# Patient Record
Sex: FEMALE | Race: WHITE | Employment: PART TIME | URBAN - METROPOLITAN AREA
[De-identification: names, ages, dates, MRNs, and addresses within clinical notes are randomized per-mention and may not be internally consistent; named-entity substitution may affect disease eponyms.]

---

## 2020-12-11 ENCOUNTER — OFFICE VISIT (OUTPATIENT)
Dept: ORTHOPEDIC SURGERY | Age: 65
End: 2020-12-11
Payer: COMMERCIAL

## 2020-12-11 VITALS — BODY MASS INDEX: 25.83 KG/M2 | TEMPERATURE: 96.6 F | RESPIRATION RATE: 12 BRPM | WEIGHT: 155 LBS | HEIGHT: 65 IN

## 2020-12-11 PROCEDURE — 1036F TOBACCO NON-USER: CPT | Performed by: ORTHOPAEDIC SURGERY

## 2020-12-11 PROCEDURE — G8484 FLU IMMUNIZE NO ADMIN: HCPCS | Performed by: ORTHOPAEDIC SURGERY

## 2020-12-11 PROCEDURE — G8427 DOCREV CUR MEDS BY ELIG CLIN: HCPCS | Performed by: ORTHOPAEDIC SURGERY

## 2020-12-11 PROCEDURE — 99203 OFFICE O/P NEW LOW 30 MIN: CPT | Performed by: ORTHOPAEDIC SURGERY

## 2020-12-11 PROCEDURE — G8419 CALC BMI OUT NRM PARAM NOF/U: HCPCS | Performed by: ORTHOPAEDIC SURGERY

## 2020-12-11 PROCEDURE — 3017F COLORECTAL CA SCREEN DOC REV: CPT | Performed by: ORTHOPAEDIC SURGERY

## 2020-12-11 RX ORDER — LATANOPROST 50 UG/ML
SOLUTION/ DROPS OPHTHALMIC
COMMUNITY
Start: 2020-11-11

## 2020-12-11 RX ORDER — MELOXICAM 15 MG/1
15 TABLET ORAL DAILY
Qty: 30 TABLET | Refills: 2 | Status: SHIPPED | OUTPATIENT
Start: 2020-12-11 | End: 2021-03-11

## 2020-12-11 ASSESSMENT — ENCOUNTER SYMPTOMS
GASTROINTESTINAL NEGATIVE: 1
EYES NEGATIVE: 1
ALLERGIC/IMMUNOLOGIC NEGATIVE: 1
RESPIRATORY NEGATIVE: 1
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
## 2020-12-14 ENCOUNTER — HOSPITAL ENCOUNTER (OUTPATIENT)
Dept: PHYSICAL THERAPY | Age: 65
Setting detail: THERAPIES SERIES
Discharge: HOME | End: 2020-12-14
Payer: COMMERCIAL

## 2020-12-14 PROCEDURE — 97161 PT EVAL LOW COMPLEX 20 MIN: CPT | Performed by: PHYSICAL THERAPIST

## 2020-12-14 PROCEDURE — 97110 THERAPEUTIC EXERCISES: CPT | Performed by: PHYSICAL THERAPIST

## 2020-12-14 PROCEDURE — 97530 THERAPEUTIC ACTIVITIES: CPT | Performed by: PHYSICAL THERAPIST

## 2020-12-14 NOTE — PLAN OF CARE
[facility], [address] [place], [address]  Phone: [phone]   Fax: [phone]          Physical Therapy Certification    Dear Referring Practitioner: Mohini Zhu,    We had the pleasure of evaluating the following patient for physical therapy services at [address] [place] [address]. A summary of our findings can be found in the initial assessment below. This includes our plan of care. If you have any questions or concerns regarding these findings, please do not hesitate to contact me at the office phone number checked above. Thank you for the referral.       Physician Signature:_______________________________Date:__________________  By signing above (or electronic signature), therapists plan is approved by physician      Patient: Maria Elena Bellamy   : 1955   MRN: [id number]  Referring Physician: Referring Practitioner: Mohini Zhu      Evaluation Date: 2020      Medical Diagnosis Information:  Diagnosis: M75.41 (ICD-10-CM) - Impingement syndrome of right shoulder; M25.511 (ICD-10-CM) - Acute pain of right shoulder   Treatment Diagnosis: M25.511 (ICD-10-CM) - Acute pain of right shoulder                                           Precautions/ Contra-indications: h/o frozen shoulder on the left. Hysterectomy in .   C-SSRS Triggered by Intake questionnaire (Past 2 wk assessment):   [x] No, Questionnaire did not trigger screening.   [] Yes, Patient intake triggered further evaluation      [] C-SSRS Screening completed  [] PCP notified via Plan of Care  [] Emergency services notified   Latex Allergy:  [x]NO      []YES  Preferred Language for Healthcare:   [x]English       []other: Supraspinatus      Upper Trap      Lower Trap      Mid Trap      Rhomboids      Biceps      Triceps      Horizontal Abduction      Horizontal Adduction      Lats        Special Tests Left Right   Apley Scratch IR:  ER:   Cross body: IR: to T2  ER:  Cross Body:   Neer's     Full Can     Empty Can     Rey Platts Negative, but CBA +    Nerve Tension Testing     Speed's     Kirby's      Spurling's     Repeated Scaption                Reflexes/Sensation (myotomes/dermatomes): pt denies sensation changes    Joint mobility: tightness noted in IR   [x]Normal    []Hypo   []Hyper    Palpation: -TTP, but palpable knot noticed on mid trap    Functional Mobility/Transfers: see above    Posture: good overall    Bandages/Dressings/Incisions: NA    Gait:  WFL    Orthopedic Special Tests: see above                       [x] Patient history, allergies, meds reviewed. Medical chart reviewed. See intake form. Review Of Systems (ROS):  [x]Performed Review of systems (Integumentary, CardioPulmonary, Neurological) by intake and observation. Intake form has been scanned into medical record. Patient has been instructed to contact their primary care physician regarding ROS issues if not already being addressed at this time.       Co-morbidities/Complexities (which will affect course of rehabilitation):   []None           Arthritic conditions   []Rheumatoid arthritis (M05.9)  []Osteoarthritis (M19.91)   Cardiovascular conditions   []Hypertension (I10)  []Hyperlipidemia (E78.5)  []Angina pectoris (I20)  []Atherosclerosis (I70)   Musculoskeletal conditions   []Disc pathology   []Congenital spine pathologies   []Prior surgical intervention  []Osteoporosis (M81.8)  []Osteopenia (M85.8)   Endocrine conditions   []Hypothyroid (E03.9)  []Hyperthyroid Gastrointestinal conditions   []Constipation (U87.74)   Metabolic conditions   []Morbid obesity (E66.01)  []Diabetes type 1(E10.65) or 2 (E11.65)   []Neuropathy (G60.9) Pulmonary conditions   []Asthma (J45)  []Coughing   []COPD (J44.9)   Psychological Disorders  []Anxiety (F41.9)  []Depression (F32.9)   []Other:   [x]Other: h/o left adhesive capsulitis         Barriers to/and or personal factors that will affect rehab potential:              []Age  []Sex              []Motivation/Lack of Motivation                        []Co-Morbidities              []Cognitive Function, education/learning barriers              []Environmental, home barriers              [x]profession/work barriers  [x]past PT/medical experience  []other:  Justification: Pt has been seen several years ago for her contralateral shoulder. She is very busy at work as a  where she uses her arms for prolonged periods of time. She is motivated to improve. Her strength seems to be her main limitation with some tightness in her middle trap and in IR, which she reports has improved significantly since starting the medication. Falls Risk Assessment (30 days):   [x] Falls Risk assessed and no intervention required.   [] Falls Risk assessed and Patient requires intervention due to being higher risk   TUG score (>12s at risk):     [] Falls education provided, including           ASSESSMENT:   Functional Impairments   []Noted spinal or UE joint hypomobility   []Noted spinal or UE joint hypermobility   [x]Decreased UE functional ROM   [x]Decreased UE functional strength   []Abnormal reflexes/sensation/myotomal/dermatomal deficits   [x]Decreased RC/scapular/core strength and neuromuscular control   []other:      Functional Activity Limitations (from functional questionnaire and intake)   [x]Reduced ability to tolerate prolonged functional positions   []Reduced ability or difficulty with changes of positions or transfers between positions   []Reduced ability to maintain good posture and demonstrate good body mechanics with sitting, bending, and lifting [] Reduced ability or tolerance with driving and/or computer work   []Reduced ability to sleep   [x]Reduced ability to perform lifting, reaching, carrying tasks   []Reduced ability to tolerate impact through UE   []Reduced ability to reach behind back   []Reduced ability to  or hold objects   []Reduced ability to throw or toss an object   []other:    Participation Restrictions   [x]Reduced participation in self care activities   [x]Reduced participation in home management activities   [x]Reduced participation in work activities   []Reduced participation in social activities. []Reduced participation in sport/recreation activities. Classification:   []Signs/symptoms consistent with post-surgical status including decreased ROM, strength and function.   []Signs/symptoms consistent with joint sprain/strain   []Signs/symptoms consistent with shoulder impingement   []Signs/symptoms consistent with shoulder/elbow/wrist tendinopathy   []Signs/symptoms consistent with Rotator cuff tear   []Signs/symptoms consistent with labral tear   []Signs/symptoms consistent with postural dysfunction    []Signs/symptoms consistent with Glenohumeral IR Deficit - <45 degrees   []Signs/symptoms consistent with facet dysfunction of cervical/thoracic spine    []Signs/symptoms consistent with pathology which may benefit from Dry needling []other: Pt is a 62 y/o female presenting with diagnosis of right shoulder impingement and pain from the MD.  Clinically, the pt presents with decreased ROM, decreased strength, decreased function, and increased pain consistent with the MD diagnosis. The pt would benefit from skilled PT to return to PLOF. Pt has been seen several years ago for her contralateral shoulder. She is very busy at work as a  where she uses her arms for prolonged periods of time. She is motivated to improve. Her strength seems to be her main limitation with some tightness in her middle trap and in IR, which she reports has improved significantly since starting the medication. We did review insurance benefits. All of pt's questions were answered. Pt was agreeable. Prognosis/Rehab Potential:      []Excellent   [x]Good    []Fair   []Poor    Tolerance of evaluation/treatment:    []Excellent   [x]Good    []Fair   []Poor    PLAN  Frequency/Duration:  1-2 days per week for 4-6 Weeks:  Interventions:  [x]  Therapeutic exercise including: strength training, ROM, for Lower extremity and core   [x]  NMR activation and proprioception for LE, Glutes and Core   [x]  Manual therapy as indicated for LE, Hip and spine to include: Dry Needling/IASTM, STM, PROM, Gr I-IV mobilizations, manipulation. [x] Modalities as needed that may include: thermal agents, E-stim, Biofeedback, US, iontophoresis as indicated  [x] Patient education on joint protection, postural re-education, activity modification, progression of HEP. HEP instruction: (see scanned forms)      GOALS:   Patient stated goal: be free of pain    [] Progressing: [] Met: [] Not Met: [] Adjusted    Therapist goals for Patient:   Short Term Goals: To be achieved in: 2 weeks  1. Independent in HEP and progression per patient tolerance, in order to prevent re-injury.      [] Progressing: [] Met: [] Not Met: [] Adjusted 2. Patient will report pain at worst less than or equal to 3/10. [] Progressing: [] Met: [] Not Met: [] Adjusted    Long Term Goals: To be achieved in: 6 weeks  1. Pt will demo UEFI of 90% or better to assist with reaching prior level of function. [] Progressing: [] Met: [] Not Met: [] Adjusted  2. Patient will demonstrate increased AROM to shoulder flex greater than or equal to 175, ABD greater than or equal to 180, ER greater than or equal to 110, IR greater than or equal to 60 to allow for proper joint functioning as indicated by patients Functional Deficits. [] Progressing: [] Met: [] Not Met: [] Adjusted  3. Patient will demonstrate an increase in strength to shoulder flex greater than or equal to 4+, ABD greater than or equal to 4+, ER greater than or equal to 4, IR greater than or equal to 4+ to allow for proper functional mobility as indicated by patients functional deficits. [] Progressing: [] Met: [] Not Met: [] Adjusted  4. Patient will return to work activities without increased symptoms or restriction. [] Progressing: [] Met: [] Not Met: [] Adjusted  5. Pt will report pain at worst less than or equal to 2/10.   [] Progressing: [] Met: [] Not Met: [] Adjusted          Physical Therapy Evaluation Complexity Justification  [x] A history of present problem with:  [] no personal factors and/or comorbidities that impact the plan of care;  [x]1-2 personal factors and/or comorbidities that impact the plan of care  []3 personal factors and/or comorbidities that impact the plan of care  [x] An examination of body systems using standardized tests and measures addressing any of the following: body structures and functions (impairments), activity limitations, and/or participation restrictions;:  [] a total of 1-2 or more elements   [x] a total of 3 or more elements   [] a total of 4 or more elements   [x] A clinical presentation with:  [x] stable and/or uncomplicated characteristics [] evolving clinical presentation with changing characteristics  [] unstable and unpredictable characteristics;   [x] Clinical decision making of [x] low, [] moderate, [] high complexity using standardized patient assessment instrument and/or measurable assessment of functional outcome.     [x] EVAL (LOW) 94708 (typically 20 minutes face-to-face)  [] EVAL (MOD) 64071 (typically 30 minutes face-to-face)  [] EVAL (HIGH) 80137 (typically 45 minutes face-to-face)  [] Rox Nieves    Electronically signed by:  Ivon Chatterjee, PT, DPT [id number]

## 2020-12-14 NOTE — FLOWSHEET NOTE
Orthopaedics and [address] [place] [address] DR CHRISTI RIDER                   Physical Therapy Treatment Note/ Progress Report:           Date:  2020    Patient Name:  Conley Schwab    :  1955  MRN: [id number]  Restrictions/Precautions:    Medical/Treatment Diagnosis Information:  · Diagnosis: M75.41 (ICD-10-CM) - Impingement syndrome of right shoulder; M25.511 (ICD-10-CM) - Acute pain of right shoulder. Onset- Oct-2020  · Treatment Diagnosis: M25.511 (ICD-10-CM) - Acute pain of right shoulder  Insurance/Certification information:  PT Insurance Information: [facility]  Physician Information:  Referring Practitioner: Criselda Gunter  Has the plan of care been signed (Y/N):        []  Yes  [x]  No     Date of Patient follow up with Physician: prn after 2021      Is this a Progress Report:     []  Yes  [x]  No        If Yes:  Date Range for reporting period:  Beginning 14 Dec 2020  Ending    Progress report will be due (10 Rx or 30 days whichever is less): visit 10 or 8336       Recertification will be due (POC Duration  / 90 days whichever is less): see above        Visit # Insurance Allowable Auth Required   1 BMN []  Yes [x]  No  Massage and ESU not covered        Functional Scale: UEFI-Incomplete   Date assessed: 2020    Latex Allergy:  [x]NO      []YES  Preferred Language for Healthcare:   [x]English       []other:    Pain level:  See eval     SUBJECTIVE:  See eval    OBJECTIVE: See eval  ? Observation:   ? Test measurements:      ROM PROM AROM  Comment    L R L R    Flexion        Abduction        ER        IR        Other        Other             Strength L R Comment   Flexion      Abduction      ER      IR      Supraspinatus      Upper Trap      Lower Trap      Mid Trap      Rhomboids      Biceps      Triceps      Horizontal Abduction      Horizontal Adduction      Lats          RESTRICTIONS/PRECAUTIONS: h/o frozen shoulder on the left. Hysterectomy in .       Exercises/Interventions: Exercise/Equipment Resistance/Repetitions Other comments   Aerobic Conditioning     Aerodyne          Stretching/PROM     Wand     Table Slides     Wall slides      UE [place]     Pulleys     Pendulum     BB IR     SL IR 10x:10    Pec doorway stretch     CBA stretch     UT stretch     Barrel hug 10x:10         LS stretch     Isometrics     Retraction          Weight shift     Flexion 10x:10    Abduction 10x:10    External Rotation 10x:10    Internal Rotation 10x:10    Biceps     Triceps          PRE's     Flexion     Abduction     External Rotation     Internal Rotation     Shrugs     EXT 3x10 red    Reverse Flys     Serratus 3x10    Horizontal Abd with ER     Biceps     Triceps     Retraction 3x10 blue         Cable Column/Theraband     External Rotation     Internal Rotation     Shrugs     Lats     Ext     Flex     Scapular Retraction     BIC     TRIC     PNF          Dynamic Stability          Plyoback            Access Code: [email or web address]   URL: [email or web address]   Date: 12/14/2020   Prepared by: Rose Marie Archibald     Exercises  Seated 55 Gallon Barrel Hug Stretch - 10 reps - 10 hold - 2x daily - 7x weekly  Sleeper Stretch - 10 reps - 10 hold - 2x daily - 7x weekly  Supine Scapular Protraction in Flexion with Dumbbells - 10 reps - 3 sets - 2x daily - 7x weekly  Isometric Shoulder Flexion at Wall - 10 reps - 10 hold - 1x daily - 7x weekly  Standing Isometric Shoulder Internal Rotation at Doorway - 10 reps - 10 hold - 1x daily - 7x weekly  Standing Isometric Shoulder External Rotation with Doorway - 10 reps - 10 hold - 1x daily - 7x weekly  Isometric Shoulder Abduction at Wall - 10 reps - 10 hold - 1x daily                            - 7x weekly  Scapular Retraction with Resistance - 10 reps - 3 sets - 1-2x daily - 7x weekly  Shoulder extension with resistance - Neutral - 10 reps - 3 sets - 1-2x daily - 7x weekly      Therapeutic Exercise and NMR EXR [x] (65262) Provided verbal/tactile cueing for activities related to strengthening, flexibility, endurance, ROM for improvements in LE, proximal hip, and core control with self care, mobility, lifting, ambulation. [x] (13922) Provided verbal/tactile cueing for activities related to improving balance, coordination, kinesthetic sense, posture, motor skill, proprioception  to assist with LE, proximal hip, and core control in self care, mobility, lifting, ambulation and eccentric single leg control.      NMR and Therapeutic Activities:    [x] (45348 or 20377) Provided verbal/tactile cueing for activities related to improving balance, coordination, kinesthetic sense, posture, motor skill, proprioception and motor activation to allow for proper function of core, proximal hip and LE with self care and ADLs  [x] (14470) Gait Re-education- Provided training and instruction to the patient for proper LE, core and proximal hip recruitment and positioning and eccentric body weight control with ambulation re-education including up and down stairs     Home Exercise Program:    [x] (23795) Reviewed/Progressed HEP activities related to strengthening, flexibility, endurance, ROM of core, proximal hip and LE for functional self-care, mobility, lifting and ambulation/stair navigation   [x] (59998)Reviewed/Progressed HEP activities related to improving balance, coordination, kinesthetic sense, posture, motor skill, proprioception of core, proximal hip and LE for self care, mobility, lifting, and ambulation/stair navigation      Manual Treatments:  PROM / STM / Oscillations-Mobs:  G-I, II, III, IV (PA's, Inf., Post.) [x] (28922) Provided manual therapy to mobilize LE, proximal hip and/or LS spine soft tissue/joints for the purpose of modulating pain, promoting relaxation,  increasing ROM, reducing/eliminating soft tissue swelling/inflammation/restriction, improving soft tissue extensibility and allowing for proper ROM for normal function with self care, mobility, lifting and ambulation. Modalities:      Charges:  Timed Code Treatment Minutes: 30'   Total Treatment Minutes: 61'     [x] EVAL (LOW) 29137   [] EVAL (MOD) 45168   [] EVAL (HIGH) 97864   [] RE-EVAL   [x] MM(63360) x  1   [] IONTO  [] NMR (59314) x     [] VASO  [] Manual (01517) x      [] Other:  [x] TA x   1   [] Mech Traction (64320)  [] ES(attended) (65468)      [] ES (un) (15864): NOT COVERED AS WELL AS MASSAGE       GOALS:   Patient stated goal: be free of pain    []? Progressing: []? Met: []? Not Met: []? Adjusted     Therapist goals for Patient:   Short Term Goals: To be achieved in: 2 weeks  1. Independent in HEP and progression per patient tolerance, in order to prevent re-injury. []? Progressing: []? Met: []? Not Met: []? Adjusted   2. Patient will report pain at worst less than or equal to 3/10. []? Progressing: []? Met: []? Not Met: []? Adjusted     Long Term Goals: To be achieved in: 6 weeks  1. Pt will demo UEFI of 90% or better to assist with reaching prior level of function. []? Progressing: []? Met: []? Not Met: []? Adjusted  2. Patient will demonstrate increased AROM to shoulder flex greater than or equal to 175, ABD greater than or equal to 180, ER greater than or equal to 110, IR greater than or equal to 60 to allow for proper joint functioning as indicated by patients Functional Deficits. []? Progressing: []? Met: []? Not Met: []?  Adjusted

## 2020-12-21 ENCOUNTER — HOSPITAL ENCOUNTER (OUTPATIENT)
Dept: PHYSICAL THERAPY | Age: 65
Setting detail: THERAPIES SERIES
Discharge: HOME | End: 2020-12-21
Payer: COMMERCIAL

## 2020-12-21 PROCEDURE — 97110 THERAPEUTIC EXERCISES: CPT | Performed by: PHYSICAL THERAPIST

## 2020-12-21 PROCEDURE — 97530 THERAPEUTIC ACTIVITIES: CPT | Performed by: PHYSICAL THERAPIST

## 2020-12-21 NOTE — FLOWSHEET NOTE
Supine Shoulder Flexion AAROM with Hands Clasped - 1-2x daily - 7x weekly  Standing Shoulder Scaption - 10 reps - 3 sets - 1x daily - 3x weekly  Standing Wall Ball Circles with [address] - 10 reps - 3 sets - 1x daily - 3x weekly  Standing Shoulder Shrugs - 10 reps - 3 sets - 1x daily - 3x weekly  Standing Bicep Curls Supinated with Dumbbells - 10 reps - 3 sets                            - 1x daily - 3x weekly      Therapeutic Exercise and NMR EXR  [x] (01048) Provided verbal/tactile cueing for activities related to strengthening, flexibility, endurance, ROM for improvements in LE, proximal hip, and core control with self care, mobility, lifting, ambulation. [x] (38418) Provided verbal/tactile cueing for activities related to improving balance, coordination, kinesthetic sense, posture, motor skill, proprioception  to assist with LE, proximal hip, and core control in self care, mobility, lifting, ambulation and eccentric single leg control.      NMR and Therapeutic Activities:    [x] (22506 or 55063) Provided verbal/tactile cueing for activities related to improving balance, coordination, kinesthetic sense, posture, motor skill, proprioception and motor activation to allow for proper function of core, proximal hip and LE with self care and ADLs  [x] (63115) Gait Re-education- Provided training and instruction to the patient for proper LE, core and proximal hip recruitment and positioning and eccentric body weight control with ambulation re-education including up and down stairs     Home Exercise Program:    [x] (33773) Reviewed/Progressed HEP activities related to strengthening, flexibility, endurance, ROM of core, proximal hip and LE for functional self-care, mobility, lifting and ambulation/stair navigation 2. Patient will demonstrate increased AROM to shoulder flex greater than or equal to 175, ABD greater than or equal to 180, ER greater than or equal to 110, IR greater than or equal to 60 to allow for proper joint functioning as indicated by patients Functional Deficits. []? Progressing: []? Met: []? Not Met: []? Adjusted  3. Patient will demonstrate an increase in strength to shoulder flex greater than or equal to 4+, ABD greater than or equal to 4+, ER greater than or equal to 4, IR greater than or equal to 4+ to allow for proper functional mobility as indicated by patients functional deficits. []? Progressing: []? Met: []? Not Met: []? Adjusted  4. Patient will return to work activities without increased symptoms or restriction. []? Progressing: []? Met: []? Not Met: []? Adjusted  5. Pt will report pain at worst less than or equal to 2/10. []? Progressing: []? Met: []? Not Met: []? Adjusted         Overall Progression Towards Functional goals/ Treatment Progress Update:  [] Patient is progressing as expected towards functional goals listed. [] Progression is slowed due to complexities/Impairments listed. [] Progression has been slowed due to co-morbidities.   [x] Plan just implemented, too soon to assess goals progression <30days   [] Goals require adjustment due to lack of progress  [] Patient is not progressing as expected and requires additional follow up with physician  [] Other    Prognosis for POC: [x] Good [] Fair  [] Poor      Patient requires continued skilled intervention: [x] Yes  [] No    Treatment/Activity Tolerance:  [x] Patient able to complete treatment  [] Patient limited by fatigue  [] Patient limited by pain     [] Patient limited by other medical complications [] Other: Pt hermila tx well. She hermila several progressions without c/o. I did progress her HEP as well. She demo good comprehension of her HEP with minor cuing needed. The newer exercises required mod cuing. Pt is making good progress overall. Pt would benefit from continued skilled PT to improve strength, ROM, and function. PLAN: If pt doesn't return, this note can be considered a D/C note. Pt to return after the New Year due to the holiday.   [x] Continue per plan of care [] Alter current plan (see comments above)  [] Plan of care initiated [] Hold pending MD visit [] Discharge  Electronically signed by: Tameka Perdue, PRINCESST [id number]

## 2021-01-04 ENCOUNTER — HOSPITAL ENCOUNTER (OUTPATIENT)
Dept: PHYSICAL THERAPY | Age: 66
Setting detail: THERAPIES SERIES
Discharge: HOME | End: 2021-01-04
Payer: COMMERCIAL

## 2021-01-04 PROCEDURE — 97110 THERAPEUTIC EXERCISES: CPT | Performed by: PHYSICAL THERAPIST

## 2021-01-04 PROCEDURE — 97530 THERAPEUTIC ACTIVITIES: CPT | Performed by: PHYSICAL THERAPIST

## 2021-01-04 NOTE — FLOWSHEET NOTE
Orthopaedics and [address] [place] [address] DR CHRISTI RIDER                   Physical Therapy Treatment Note/ Progress Report:           Date:  2021    Patient Name:  Lizzy Rojo    :  1955  MRN: [id number]  Restrictions/Precautions:    Medical/Treatment Diagnosis Information:  · Diagnosis: M75.41 (ICD-10-CM) - Impingement syndrome of right shoulder; M25.511 (ICD-10-CM) - Acute pain of right shoulder. Onset- Oct-2020  · Treatment Diagnosis: M25.511 (ICD-10-CM) - Acute pain of right shoulder  Insurance/Certification information:  PT Insurance Information: [facility]  Physician Information:  Referring Practitioner: Jeevan Beauchamp  Has the plan of care been signed (Y/N):        []  Yes  [x]  No     Date of Patient follow up with Physician: prn after 2021      Is this a Progress Report:     []  Yes  [x]  No        If Yes:  Date Range for reporting period:  Beginning 14 Dec 2020  Ending    Progress report will be due (10 Rx or 30 days whichever is less): visit 10 or 4693       Recertification will be due (POC Duration  / 90 days whichever is less): see above        Visit # Insurance Allowable Auth Required   2+1 for  BMN []  Yes [x]  No  Massage and ESU not covered        Functional Scale: UEFI-Incomplete   Date assessed: 2020    Latex Allergy:  [x]NO      []YES  Preferred Language for Healthcare:   [x]English       []other:    Pain level:  4/10    SUBJECTIVE:  She stopped taking the meloxicam about 10 days ago. She does have more pain, but it's definitely better than it was. The worst her pain gets is about 6/10. When she does her full routine with the strengthening, she feels it more. This morning though it feels really good. OBJECTIVE: 2021   ?  Observation:   ? 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 Hysterectomy in 2002. Exercises/Interventions:   Exercise/Equipment Resistance/Repetitions Other comments   Aerobic Conditioning     Aerodyne          Stretching/PROM     Wand     Table Slides     Wall slides      UE [place]     Pulleys     Pendulum     BB IR     SL IR 10x:10    Pec doorway stretch     CBA stretch     UT stretch 3x:30    Barrel hug 10x:10         LS stretch 3x:30    Isometrics     Retraction          Weight shift     Flexion     Abduction     External Rotation     Internal Rotation     Biceps     Triceps          PRE's     Flexion     Abduction 3x10 scaption   External Rotation     Internal Rotation     Shrugs 3x10 4#    EXT     Reverse Flys     Serratus 3x10 3#    Horizontal Abd with ER     Biceps 3x10 4#    Triceps 3x10 3#     Retraction     UK deltoid 5' 2#              Cable Column/Theraband     External Rotation 3x10 red    Internal Rotation 3x10 blue    Shrugs     Lats     Ext 3x10 black    Flex     Scapular Retraction 3x10 black    BIC     TRIC     PNF          Dynamic Stability     Ball on wall 4 way 3x10              Plyoback            Access Code: [email or web address]   URL: [email or web address]   Date: 01/04/2021   Prepared by: De Tong     Exercises  Seated 55 Gallon Barrel Hug Stretch - 10 reps - 10 hold - 2x daily - 7x weekly  Sleeper Stretch - 10 reps - 10 hold - 2x daily - 7x weekly  Supine Scapular Protraction in Flexion with Dumbbells - 10 reps - 3 sets - 2x daily - 7x weekly  Shoulder External Rotation with Anchored Resistance - 10 reps - 3 sets - 1x daily - 3x weekly  Shoulder Internal Rotation with Resistance - 10 reps - 3 sets - 1x daily - 3x weekly Home Exercise Program:    [x] (89660) Reviewed/Progressed HEP activities related to strengthening, flexibility, endurance, ROM of core, proximal hip and LE for functional self-care, mobility, lifting and ambulation/stair navigation   [x] (10354)Reviewed/Progressed HEP activities related to improving balance, coordination, kinesthetic sense, posture, motor skill, proprioception of core, proximal hip and LE for self care, mobility, lifting, and ambulation/stair navigation      Manual Treatments:  PROM / STM / Oscillations-Mobs:  G-I, II, III, IV (PA's, Inf., Post.)  [x] (16453) Provided manual therapy to mobilize LE, proximal hip and/or LS spine soft tissue/joints for the purpose of modulating pain, promoting relaxation,  increasing ROM, reducing/eliminating soft tissue swelling/inflammation/restriction, improving soft tissue extensibility and allowing for proper ROM for normal function with self care, mobility, lifting and ambulation. Modalities:      Charges:  Timed Code Treatment Minutes: 40'   Total Treatment Minutes: 48'     [] EVAL (LOW) 08743   [] EVAL (MOD) 61355   [] EVAL (HIGH) 80695   [] RE-EVAL   [x] XQ(72071) x  2   [] IONTO  [] NMR (00231) x     [] VASO  [] Manual (55738) x      [] Other:  [x] TA x   1   [] Mech Traction (67243)  [] ES(attended) (71437)      [] ES (un) (31922): NOT COVERED AS WELL AS MASSAGE       GOALS:   Patient stated goal: be free of pain    []? Progressing: []? Met: []? Not Met: []? Adjusted     Therapist goals for Patient:   Short Term Goals: To be achieved in: 2 weeks  1. Independent in HEP and progression per patient tolerance, in order to prevent re-injury. []? Progressing: []? Met: []? Not Met: []? Adjusted   2. Patient will report pain at worst less than or equal to 3/10. []? Progressing: []? Met: []? Not Met: []? Adjusted     Long Term Goals: To be achieved in: 6 weeks  1. Pt will demo UEFI of 90% or better to assist with reaching prior level of function. []? Progressing: []? Met: []? Not Met: []? Adjusted  2. Patient will demonstrate increased AROM to shoulder flex greater than or equal to 175, ABD greater than or equal to 180, ER greater than or equal to 110, IR greater than or equal to 60 to allow for proper joint functioning as indicated by patients Functional Deficits. []? Progressing: []? Met: []? Not Met: []? Adjusted  3. Patient will demonstrate an increase in strength to shoulder flex greater than or equal to 4+, ABD greater than or equal to 4+, ER greater than or equal to 4, IR greater than or equal to 4+ to allow for proper functional mobility as indicated by patients functional deficits. []? Progressing: []? Met: []? Not Met: []? Adjusted  4. Patient will return to work activities without increased symptoms or restriction. []? Progressing: []? Met: []? Not Met: []? Adjusted  5. Pt will report pain at worst less than or equal to 2/10. []? Progressing: []? Met: []? Not Met: []? Adjusted         Overall Progression Towards Functional goals/ Treatment Progress Update:  [] Patient is progressing as expected towards functional goals listed. [] Progression is slowed due to complexities/Impairments listed. [] Progression has been slowed due to co-morbidities.   [x] Plan just implemented, too soon to assess goals progression <30days   [] Goals require adjustment due to lack of progress  [] Patient is not progressing as expected and requires additional follow up with physician  [] Other    Prognosis for POC: [x] Good [] Fair  [] Poor      Patient requires continued skilled intervention: [x] Yes  [] No    Treatment/Activity Tolerance:  [x] Patient able to complete treatment  [] Patient limited by fatigue  [] Patient limited by pain     [] Patient limited by other medical complications [] Other: Pt hermila tx well. She demo improved IR. She is having less pain overall. We discussed meloxicam and the goals of it. I did review her HEP with progressions. For example, pt was doing heavier weight for ER and scaption. We reviewed form on these and why it's important. Pt would benefit from continued skilled PT to improve strength, ROM, and function. PLAN: If pt doesn't return, this note can be considered a D/C note. Progress strength per pt hermila.    [x] Continue per plan of care [] Alter current plan (see comments above)  [] Plan of care initiated [] Hold pending MD visit [] Discharge  Electronically signed by: Augusto Naqvi, DPANDRES [id number]

## 2021-01-11 ENCOUNTER — HOSPITAL ENCOUNTER (OUTPATIENT)
Dept: PHYSICAL THERAPY | Age: 66
Setting detail: THERAPIES SERIES
Discharge: HOME | End: 2021-01-11
Payer: COMMERCIAL

## 2021-01-11 PROCEDURE — 97110 THERAPEUTIC EXERCISES: CPT | Performed by: PHYSICAL THERAPIST

## 2021-01-11 PROCEDURE — 97530 THERAPEUTIC ACTIVITIES: CPT | Performed by: PHYSICAL THERAPIST

## 2021-01-11 NOTE — PLAN OF CARE
Orthopaedics and [address] [place] [address] DR CHRISTI RIDER                   Physical Therapy Treatment Note/ Progress Report:      Physical Therapy Re-Certification Plan of Care    Dear Dr. Jc Johnson,    We had the pleasure of treating the following patient for physical therapy services at [address] [place] [address]. A summary of our findings can be found in the updated assessment below. This includes our plan of care. If you have any questions or concerns regarding these findings, please do not hesitate to contact me at the office phone number checked above. Thank you for the referral.     Physician Signature:________________________________Date:__________________  By signing above (or electronic signature), therapists plan is approved by physician    Date Range Of Visits:   Total Visits to Date: 4  Overall Response to Treatment:   [x] Patient is responding well to treatment and improvement is noted with regards to goals   [] Patient should continue to improve in reasonable time if they continue HEP   [] Patient has plateaued and is no longer responding to skilled PT intervention    [] Patient is getting worse and would benefit from return to referring MD   [] Patient unable to adhere to initial POC   [] Other: Pt hermila tx well. She demo good ROM and no pain with strength testing. Pt is having less pain overall also. She requires min cuing for proper form. We discussed how to progress her exercises. She is doing well overall. Due to this, she will f/u with me in 2-3 weeks with anticipation of weaning to HEP. If pt has problems or questions before that, she can return or call earlier. Pt would benefit from continued skilled PT to improve strength, ROM, and function.         Date:  2021    Patient Name:  Kyle Murcia    :  1955  MRN: [id number]  Restrictions/Precautions:    Medical/Treatment Diagnosis Information: RESTRICTIONS/PRECAUTIONS: h/o frozen shoulder on the left. Hysterectomy in 2002. Exercises/Interventions:   Exercise/Equipment Resistance/Repetitions Other comments   Aerobic Conditioning     Aerodyne          Stretching/PROM     Wand     Table Slides     Wall slides      UE [place]     Pulleys     Pendulum     BB IR     SL IR 10x:10    Pec doorway stretch     CBA stretch     UT stretch 3x:30    Barrel hug 10x:10         LS stretch 3x:30    Isometrics     Retraction          Weight shift     Flexion     Abduction     External Rotation     Internal Rotation     Biceps     Triceps          PRE's     Flexion     Abduction 3x10 3# scaption   External Rotation     Internal Rotation     Shrugs 3x10 6#    EXT     Reverse Flys     Serratus 3x10 4#    Horizontal Abd with ER     Biceps 3x10 5#    Triceps 3x10 4#     Retraction     UK deltoid 5' 2#              Cable Column/Theraband     External Rotation 3x10 green    Internal Rotation 3x10 black    Shrugs     Lats     Ext 3x10 black    Flex     Scapular Retraction 3x10 silver    BIC     TRIC     PNF          Dynamic Stability     Ball on wall 4 way 3x10 2#              Plyoback            Access Code: [email or web address]   URL: [email or web address]   Date: 01/04/2021   Prepared by: Cb Velasquez     Exercises  Seated 55 Gallon Barrel Hug Stretch - 10 reps - 10 hold - 2x daily - 7x weekly  Sleeper Stretch - 10 reps - 10 hold - 2x daily - 7x weekly  Supine Scapular Protraction in Flexion with Dumbbells - 10 reps - 3 sets - 2x daily - 7x weekly  Shoulder External Rotation with Anchored Resistance - 10 reps - 3 sets - 1x daily - 3x weekly  Shoulder Internal Rotation with Resistance - 10 reps - 3 sets - 1x daily - 3x weekly  Shoulder extension with resistance - Neutral - 10 reps - 3 sets - 1-2x daily - 7x weekly  Scapular Retraction with Resistance - 10 reps - 3 sets - 1-2x daily - 7x weekly  Supine Shoulder Flexion AAROM with Hands Clasped - 1-2x daily - 7x weekly Standing Shoulder Scaption - 10 reps - 3 sets - 1x daily - 3x weekly  Standing Wall Ball Circles with [address] - 10 reps - 3 sets - 1x daily - 3x weekly  Standing Shoulder Shrugs - 10 reps - 3 sets - 1x daily - 3x weekly  Standing Bicep Curls Supinated with Dumbbells - 10 reps - 3 sets                            - 1x daily - 3x weekly  Supine Elbow Flexion Extension with Dumbbell - 10 reps - 3 sets - 1x daily - 3x weekly  Seated Upper Trapezius Stretch - 3-5 sets - 30 hold - 1-2x daily - 7x weekly  Seated Levator Scapulae Stretch - 3-5 sets - 30 hold - 1-2x daily - 7x weekly      Therapeutic Exercise and NMR EXR  [x] (86086) Provided verbal/tactile cueing for activities related to strengthening, flexibility, endurance, ROM for improvements in LE, proximal hip, and core control with self care, mobility, lifting, ambulation. [x] (81300) Provided verbal/tactile cueing for activities related to improving balance, coordination, kinesthetic sense, posture, motor skill, proprioception  to assist with LE, proximal hip, and core control in self care, mobility, lifting, ambulation and eccentric single leg control.      NMR and Therapeutic Activities:    [x] (54586 or 37912) Provided verbal/tactile cueing for activities related to improving balance, coordination, kinesthetic sense, posture, motor skill, proprioception and motor activation to allow for proper function of core, proximal hip and LE with self care and ADLs  [x] (57997) Gait Re-education- Provided training and instruction to the patient for proper LE, core and proximal hip recruitment and positioning and eccentric body weight control with ambulation re-education including up and down stairs     Home Exercise Program:    [x] (92541) Reviewed/Progressed HEP activities related to strengthening, flexibility, endurance, ROM of core, proximal hip and LE for functional self-care, mobility, lifting and ambulation/stair navigation [x] (34598)Reviewed/Progressed HEP activities related to improving balance, coordination, kinesthetic sense, posture, motor skill, proprioception of core, proximal hip and LE for self care, mobility, lifting, and ambulation/stair navigation      Manual Treatments:  PROM / STM / Oscillations-Mobs:  G-I, II, III, IV (PA's, Inf., Post.)  [x] (49672) Provided manual therapy to mobilize LE, proximal hip and/or LS spine soft tissue/joints for the purpose of modulating pain, promoting relaxation,  increasing ROM, reducing/eliminating soft tissue swelling/inflammation/restriction, improving soft tissue extensibility and allowing for proper ROM for normal function with self care, mobility, lifting and ambulation. Modalities:      Charges:   Timed Code Treatment Minutes: 40'   Total Treatment Minutes: 48'     [] EVAL (LOW) 80209   [] EVAL (MOD) 30599   [] EVAL (HIGH) 95363   [] RE-EVAL   [x] RR(14412) x  2   [] IONTO  [] NMR (82582) x     [] VASO  [] Manual (64161) x      [] Other:  [x] TA x   1   [] Mech Traction (50760)  [] ES(attended) (74751)      [] ES (un) (38653): NOT COVERED AS WELL AS MASSAGE       GOALS:   Patient stated goal: be free of pain    [x]? Progressing: []? Met: []? Not Met: []? Adjusted     Therapist goals for Patient:   Short Term Goals: To be achieved in: 2 weeks  1. Independent in HEP and progression per patient tolerance, in order to prevent re-injury. []? Progressing: [x]? Met: []? Not Met: []? Adjusted   2. Patient will report pain at worst less than or equal to 3/10. []? Progressing: [x]? Met: []? Not Met: []? Adjusted     Long Term Goals: To be achieved in: 6 weeks  1. Pt will demo UEFI of 90% or better to assist with reaching prior level of function. []? Progressing: [x]? Met: []? Not Met: []?  Adjusted 2. Patient will demonstrate increased AROM to shoulder flex greater than or equal to 175, ABD greater than or equal to 180, ER greater than or equal to 110, IR greater than or equal to 60 to allow for proper joint functioning as indicated by patients Functional Deficits. [x]? Progressing: []? Met: []? Not Met: []? Adjusted  3. Patient will demonstrate an increase in strength to shoulder flex greater than or equal to 4+, ABD greater than or equal to 4+, ER greater than or equal to 4, IR greater than or equal to 4+ to allow for proper functional mobility as indicated by patients functional deficits. [x]? Progressing: []? Met: []? Not Met: []? Adjusted  4. Patient will return to work activities without increased symptoms or restriction. []? Progressing: [x]? Met: []? Not Met: []? Adjusted  5. Pt will report pain at worst less than or equal to 2/10. []? Progressing: [x]? Met: []? Not Met: []? Adjusted         Overall Progression Towards Functional goals/ Treatment Progress Update:  [x] Patient is progressing as expected towards functional goals listed. [] Progression is slowed due to complexities/Impairments listed. [] Progression has been slowed due to co-morbidities.   [] Plan just implemented, too soon to assess goals progression <30days   [] Goals require adjustment due to lack of progress  [] Patient is not progressing as expected and requires additional follow up with physician  [] Other    Prognosis for POC: [x] Good [] Fair  [] Poor      Patient requires continued skilled intervention: [x] Yes  [] No    Treatment/Activity Tolerance:  [x] Patient able to complete treatment  [] Patient limited by fatigue  [] Patient limited by pain     [] Patient limited by other medical complications [] Other: Pt hermila tx well. She demo good ROM and no pain with strength testing. Pt is having less pain overall also. She requires min cuing for proper form. We discussed how to progress her exercises. She is doing well overall. Due to this, she will f/u with me in 2-3 weeks with anticipation of weaning to HEP. If pt has problems or questions before that, she can return or call earlier. Pt would benefit from continued skilled PT to improve strength, ROM, and function. PLAN: If pt doesn't return, this note can be considered a D/C note. Anticipate weaning to HEP soon.    [x] Continue per plan of care [] Alter current plan (see comments above)  [] Plan of care initiated [] Hold pending MD visit [] Discharge  Electronically signed by: Pallavi Camarena DPT [id number]

## 2024-05-17 ENCOUNTER — INITIAL CONSULT (OUTPATIENT)
Dept: VASCULAR SURGERY | Age: 69
End: 2024-05-17

## 2024-05-17 VITALS
DIASTOLIC BLOOD PRESSURE: 78 MMHG | BODY MASS INDEX: 25.74 KG/M2 | HEART RATE: 79 BPM | HEIGHT: 67 IN | WEIGHT: 164 LBS | SYSTOLIC BLOOD PRESSURE: 124 MMHG

## 2024-05-17 DIAGNOSIS — M79.89 PAIN AND SWELLING OF LEFT LOWER LEG: ICD-10-CM

## 2024-05-17 DIAGNOSIS — M79.661 PAIN AND SWELLING OF LOWER LEG, RIGHT: ICD-10-CM

## 2024-05-17 DIAGNOSIS — M79.89 PAIN AND SWELLING OF LOWER LEG, RIGHT: ICD-10-CM

## 2024-05-17 DIAGNOSIS — M79.662 PAIN AND SWELLING OF LEFT LOWER LEG: ICD-10-CM

## 2024-05-17 DIAGNOSIS — I86.8 SPIDER VARICOSE VEIN: Primary | ICD-10-CM

## 2024-05-17 PROBLEM — J34.2 DEVIATED NASAL SEPTUM: Status: ACTIVE | Noted: 2023-10-27

## 2024-05-17 PROBLEM — R68.2 DRY MOUTH: Status: ACTIVE | Noted: 2023-10-27

## 2024-05-17 PROBLEM — K21.9 LARYNGOPHARYNGEAL REFLUX (LPR): Status: ACTIVE | Noted: 2023-10-27

## 2024-05-17 PROBLEM — R09.A2 GLOBUS SENSATION: Status: ACTIVE | Noted: 2023-10-27

## 2024-05-17 ASSESSMENT — ENCOUNTER SYMPTOMS
GASTROINTESTINAL NEGATIVE: 1
ALLERGIC/IMMUNOLOGIC NEGATIVE: 1
RESPIRATORY NEGATIVE: 1

## 2024-05-17 NOTE — PROGRESS NOTES
Daily Progress Note   Figueroa Cruz MD      2024    Chief Complaint   Patient presents with    Surgical Consult     Spider viens         HISTORY OF PRESENT ILLNESS:                The patient is a 68 y.o. female who presents with a consult for spider veins and sclerotherapy.    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  She says she can walk as far as she wishes without pain that stops her. Mrs. York has spider veins bilaterally, and she does not care for how they looked, and also says by the end of the day her legs are sore to the touch. She does not currently wear compression stockings and says any time she has worn them the area at the top of her calf becomes very painful.  She denies any leg swelling.     Mrs. York is a long time friend of Dr. Cruz and his family.        No past medical history on file.    Past Surgical History:   Procedure Laterality Date     SECTION      HYSTERECTOMY (CERVIX STATUS UNKNOWN)         Social History     Socioeconomic History    Marital status:      Spouse name: Not on file    Number of children: 4    Years of education: Not on file    Highest education level: Not on file   Occupational History    Occupation:    Tobacco Use    Smoking status: Never    Smokeless tobacco: Never   Substance and Sexual Activity    Alcohol use: Yes    Drug use: Not on file    Sexual activity: Not on file   Other Topics Concern    Not on file   Social History Narrative    Not on file     Social Determinants of Health     Financial Resource Strain: Not on file   Food Insecurity: Not on file   Transportation Needs: Not on file   Physical Activity: Not on file   Stress: Not on file   Social Connections: Not on file   Intimate Partner Violence: Not on file   Housing Stability: Not on file       No family history on file.      Current Outpatient Medications:     latanoprost (XALATAN) 0.005 % ophthalmic solution, , Disp: , Rfl:     Multiple

## 2024-05-31 ENCOUNTER — OFFICE VISIT (OUTPATIENT)
Dept: VASCULAR SURGERY | Age: 69
End: 2024-05-31

## 2024-05-31 VITALS — DIASTOLIC BLOOD PRESSURE: 78 MMHG | SYSTOLIC BLOOD PRESSURE: 132 MMHG

## 2024-05-31 DIAGNOSIS — I86.8 SPIDER VARICOSE VEIN: Primary | ICD-10-CM

## 2024-05-31 PROCEDURE — 99024 POSTOP FOLLOW-UP VISIT: CPT | Performed by: SURGERY

## 2024-05-31 ASSESSMENT — ENCOUNTER SYMPTOMS
GASTROINTESTINAL NEGATIVE: 1
ALLERGIC/IMMUNOLOGIC NEGATIVE: 1
RESPIRATORY NEGATIVE: 1

## 2024-05-31 NOTE — PROGRESS NOTES
file   Food Insecurity: Not on file   Transportation Needs: Not on file   Physical Activity: Not on file   Stress: Not on file   Social Connections: Not on file   Intimate Partner Violence: Not on file   Housing Stability: Not on file       No family history on file.      Current Outpatient Medications:     latanoprost (XALATAN) 0.005 % ophthalmic solution, , Disp: , Rfl:     Ergocalciferol 10 MCG (400 UNIT) TABS, Take 1 tablet by mouth daily, Disp: , Rfl:     Multiple Vitamins-Minerals (MULTIVITAMIN ADULT PO), Take 1 tablet by mouth daily, Disp: , Rfl:     meloxicam (MOBIC) 15 MG tablet, Take 1 tablet by mouth daily Take one tab 15 mg by mouth daily with food, Disp: 30 tablet, Rfl: 2    levothyroxine (SYNTHROID) 50 MCG tablet, , Disp: , Rfl: 3    No known allergies    Vitals:    05/31/24 1217   BP: 132/78   Site: Right Upper Arm   Position: Sitting   Cuff Size: Medium Adult       No results found for any previous visit.       Review of Systems   Constitutional: Negative.    HENT: Negative.     Respiratory: Negative.     Cardiovascular: Negative.    Gastrointestinal: Negative.    Endocrine: Negative.    Musculoskeletal: Negative.    Skin: Negative.    Allergic/Immunologic: Negative.    Neurological: Negative.    Hematological: Negative.    Psychiatric/Behavioral: Negative.     All other systems reviewed and are negative.      Physical Exam  Vitals and nursing note reviewed.   Constitutional:       Appearance: Normal appearance. She is well-developed.   HENT:      Mouth/Throat:      Pharynx: No oropharyngeal exudate.   Eyes:      Conjunctiva/sclera: Conjunctivae normal.      Pupils: Pupils are equal, round, and reactive to light.   Cardiovascular:      Rate and Rhythm: Normal rate and regular rhythm.      Pulses:           Dorsalis pedis pulses are 2+ on the right side and 0 on the left side.        Posterior tibial pulses are 2+ on the right side and 2+ on the left side.      Heart sounds: Normal heart sounds.

## 2024-05-31 NOTE — PATIENT INSTRUCTIONS
Schedule to return in eight weeks for leg check.     Instructions after sclerotherapy:    Keep the cotton sponges and tape on for the next two days.  When it is time to remove them, you may find it is easier to remove them in the shower as the water may facilitate removing the tape.     Wear your stockings! This will help your legs heal faster.     Return in six weeks just to have your legs checked.      You may still see areas on your legs that don't appear to be gone/healed.  This is normal. Healing/absorbing of the spider veins can take several months.     Normal exercise will not hurt your legs, or the sclerotherapy.

## 2024-07-30 ENCOUNTER — OFFICE VISIT (OUTPATIENT)
Dept: VASCULAR SURGERY | Age: 69
End: 2024-07-30

## 2024-07-30 VITALS
SYSTOLIC BLOOD PRESSURE: 128 MMHG | HEART RATE: 82 BPM | TEMPERATURE: 98 F | OXYGEN SATURATION: 99 % | WEIGHT: 167 LBS | BODY MASS INDEX: 26.21 KG/M2 | DIASTOLIC BLOOD PRESSURE: 70 MMHG | HEIGHT: 67 IN

## 2024-07-30 DIAGNOSIS — I86.8 SPIDER VARICOSE VEIN: Primary | ICD-10-CM

## 2024-07-30 PROCEDURE — 99024 POSTOP FOLLOW-UP VISIT: CPT | Performed by: SURGERY

## 2024-07-30 ASSESSMENT — ENCOUNTER SYMPTOMS
GASTROINTESTINAL NEGATIVE: 1
RESPIRATORY NEGATIVE: 1
ALLERGIC/IMMUNOLOGIC NEGATIVE: 1

## 2024-07-30 NOTE — PROGRESS NOTES
Daily Progress Note   Figueroa Cruz MD      2024    Chief Complaint   Patient presents with    Follow-up     8 week follow up on spider veins, no longer wearing compression socks         HISTORY OF PRESENT ILLNESS:                The patient is a 68 y.o. female who presents with a consult for spider veins and sclerotherapy.    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  She says she can walk as far as she wishes without pain that stops her. Mrs. York has spider veins bilaterally, and she does not care for how they looked, and also says by the end of the day her legs are sore to the touch. She does not currently wear compression stockings and says any time she has worn them the area at the top of her calf becomes very painful.  She denies any leg swelling.     Mrs. York is a long time friend of Dr. Cruz and his family.        No past medical history on file.    Past Surgical History:   Procedure Laterality Date     SECTION      HYSTERECTOMY (CERVIX STATUS UNKNOWN)         Social History     Socioeconomic History    Marital status:      Spouse name: Not on file    Number of children: 4    Years of education: Not on file    Highest education level: Not on file   Occupational History    Occupation:    Tobacco Use    Smoking status: Never    Smokeless tobacco: Never   Substance and Sexual Activity    Alcohol use: Yes    Drug use: Not on file    Sexual activity: Not on file   Other Topics Concern    Not on file   Social History Narrative    Not on file     Social Determinants of Health     Financial Resource Strain: Not on file   Food Insecurity: Not on file   Transportation Needs: Not on file   Physical Activity: Not on file   Stress: Not on file   Social Connections: Not on file   Intimate Partner Violence: Not on file   Housing Stability: Not on file       No family history on file.      Current Outpatient Medications:     latanoprost (XALATAN) 0.005 %